# Patient Record
Sex: FEMALE | Race: WHITE | URBAN - METROPOLITAN AREA
[De-identification: names, ages, dates, MRNs, and addresses within clinical notes are randomized per-mention and may not be internally consistent; named-entity substitution may affect disease eponyms.]

---

## 2020-07-10 ENCOUNTER — OFFICE VISIT (OUTPATIENT)
Dept: URBAN - METROPOLITAN AREA CLINIC 54 | Facility: CLINIC | Age: 60
End: 2020-07-10

## 2020-07-20 ENCOUNTER — OFFICE VISIT (OUTPATIENT)
Dept: URBAN - METROPOLITAN AREA CLINIC 54 | Facility: CLINIC | Age: 60
End: 2020-07-20
Payer: COMMERCIAL

## 2020-07-20 ENCOUNTER — DASHBOARD ENCOUNTERS (OUTPATIENT)
Age: 60
End: 2020-07-20

## 2020-07-20 DIAGNOSIS — M54.9 CHRONIC BACK PAIN: ICD-10-CM

## 2020-07-20 DIAGNOSIS — F11.20 NARCOTIC DEPENDENCE: ICD-10-CM

## 2020-07-20 DIAGNOSIS — K21.9 GERD: ICD-10-CM

## 2020-07-20 PROBLEM — 75544000 OPIOID DEPENDENCE: Status: ACTIVE | Noted: 2020-07-20

## 2020-07-20 PROBLEM — 134407002 CHRONIC BACK PAIN: Status: ACTIVE | Noted: 2020-07-20

## 2020-07-20 PROBLEM — 235595009 GASTROESOPHAGEAL REFLUX DISEASE: Status: ACTIVE | Noted: 2020-07-20

## 2020-07-20 PROCEDURE — 99203 OFFICE O/P NEW LOW 30 MIN: CPT | Performed by: INTERNAL MEDICINE

## 2020-07-20 RX ORDER — ZOLPIDEM TARTRATE 10 MG/1
(SCHEDULE IV DRUG) TABLET ORAL
Qty: 30 DELAYED RELEASE TABLET | Status: ACTIVE | COMMUNITY

## 2020-07-20 RX ORDER — ROSUVASTATIN CALCIUM 40 MG/1
TABLET, FILM COATED ORAL
Qty: 90 DELAYED RELEASE TABLET | Status: ACTIVE | COMMUNITY

## 2020-07-20 RX ORDER — MONTELUKAST SODIUM 10 MG/1
1 TABLET TABLET ORAL ONCE A DAY
Qty: 30 | Status: ACTIVE | COMMUNITY
Start: 2020-07-20

## 2020-07-20 RX ORDER — RAMELTEON 8 MG/1
1 TABLET AT BEDTIME AS NEEDED TABLET, FILM COATED ORAL ONCE A DAY
Status: ACTIVE | COMMUNITY
Start: 2020-07-20

## 2020-07-20 RX ORDER — TIZANIDINE 4 MG/1
TABLET ORAL
Qty: 450 DELAYED RELEASE TABLET | Status: ACTIVE | COMMUNITY

## 2020-07-20 RX ORDER — PREGABALIN 75 MG/1
(SCHEDULE V DRUG) CAPSULE ORAL
Qty: 270 APP | Status: ACTIVE | COMMUNITY

## 2020-07-20 RX ORDER — TAPENTADOL HYDROCHLORIDE 100 MG/1
(SCHEDULE II DRUG) TABLET, FILM COATED ORAL
Qty: 120 DELAYED RELEASE TABLET | Status: ACTIVE | COMMUNITY

## 2020-07-20 RX ORDER — ERGOCALCIFEROL 1.25 MG/1
CAPSULE, LIQUID FILLED ORAL
Qty: 13 APP | Status: ACTIVE | COMMUNITY

## 2020-07-20 RX ORDER — AMLODIPINE BESYLATE 5 MG/1
TABLET ORAL
Qty: 30 DELAYED RELEASE TABLET | Status: ACTIVE | COMMUNITY

## 2020-07-20 RX ORDER — FENOFIBRATE 160 MG/1
1 TABLET TABLET ORAL ONCE A DAY
Qty: 30 | Status: ACTIVE | COMMUNITY
Start: 2020-07-20

## 2020-07-20 NOTE — HPI-OTHER HISTORIES
Patient is a 59 yo woman who is sent for an opinion regarding GERD.  Patient carries a diagnosis of GERD and hiatal hernia since age 37. She has daily heartburn with regurgitation. She takes OTC Pepcid AC 2 pills BID. She has tried Zantac for several years previously. Her last EGD was several years ago. She is not very keen on taking   medications. No family history of upper GI malignancy. She denies fever, chills, weight loss, melena, dysphagia or early satiety. Her last colonoscopy was at age 52 and normal per patient. She suffers from chronic back pain on narcotics and NSAID's.

## 2020-08-04 ENCOUNTER — CLAIMS CREATED FROM THE CLAIM WINDOW (OUTPATIENT)
Dept: URBAN - METROPOLITAN AREA CLINIC 4 | Facility: CLINIC | Age: 60
End: 2020-08-04
Payer: COMMERCIAL

## 2020-08-04 ENCOUNTER — OFFICE VISIT (OUTPATIENT)
Dept: URBAN - METROPOLITAN AREA SURGERY CENTER 14 | Facility: SURGERY CENTER | Age: 60
End: 2020-08-04
Payer: COMMERCIAL

## 2020-08-04 DIAGNOSIS — K29.60 OTHER GASTRITIS WITHOUT BLEEDING: ICD-10-CM

## 2020-08-04 DIAGNOSIS — R12 BURNING REFLUX: ICD-10-CM

## 2020-08-04 DIAGNOSIS — K31.9 DISEASE OF STOMACH AND DUODENUM, UNSPECIFIED: ICD-10-CM

## 2020-08-04 DIAGNOSIS — K21.0 GASTRO-ESOPHAGEAL REFLUX DISEASE WITH ESOPHAGITIS: ICD-10-CM

## 2020-08-04 DIAGNOSIS — K31.89 ACQUIRED DEFORMITY OF PYLORUS: ICD-10-CM

## 2020-08-04 PROCEDURE — 43239 EGD BIOPSY SINGLE/MULTIPLE: CPT | Performed by: INTERNAL MEDICINE

## 2020-08-04 PROCEDURE — 88312 SPECIAL STAINS GROUP 1: CPT | Performed by: PATHOLOGY

## 2020-08-04 PROCEDURE — G8907 PT DOC NO EVENTS ON DISCHARG: HCPCS | Performed by: INTERNAL MEDICINE

## 2020-08-04 PROCEDURE — 88305 TISSUE EXAM BY PATHOLOGIST: CPT | Performed by: PATHOLOGY

## 2020-08-04 RX ORDER — ROSUVASTATIN CALCIUM 40 MG/1
TABLET, FILM COATED ORAL
Qty: 90 DELAYED RELEASE TABLET | Status: ACTIVE | COMMUNITY

## 2020-08-04 RX ORDER — ERGOCALCIFEROL 1.25 MG/1
CAPSULE, LIQUID FILLED ORAL
Qty: 13 APP | Status: ACTIVE | COMMUNITY

## 2020-08-04 RX ORDER — TIZANIDINE 4 MG/1
TABLET ORAL
Qty: 450 DELAYED RELEASE TABLET | Status: ACTIVE | COMMUNITY

## 2020-08-04 RX ORDER — MONTELUKAST SODIUM 10 MG/1
1 TABLET TABLET ORAL ONCE A DAY
Qty: 30 | Status: ACTIVE | COMMUNITY
Start: 2020-07-20

## 2020-08-04 RX ORDER — TAPENTADOL HYDROCHLORIDE 100 MG/1
(SCHEDULE II DRUG) TABLET, FILM COATED ORAL
Qty: 120 DELAYED RELEASE TABLET | Status: ACTIVE | COMMUNITY

## 2020-08-04 RX ORDER — ZOLPIDEM TARTRATE 10 MG/1
(SCHEDULE IV DRUG) TABLET ORAL
Qty: 30 DELAYED RELEASE TABLET | Status: ACTIVE | COMMUNITY

## 2020-08-04 RX ORDER — AMLODIPINE BESYLATE 5 MG/1
TABLET ORAL
Qty: 30 DELAYED RELEASE TABLET | Status: ACTIVE | COMMUNITY

## 2020-08-04 RX ORDER — RAMELTEON 8 MG/1
1 TABLET AT BEDTIME AS NEEDED TABLET, FILM COATED ORAL ONCE A DAY
Status: ACTIVE | COMMUNITY
Start: 2020-07-20

## 2020-08-04 RX ORDER — PREGABALIN 75 MG/1
(SCHEDULE V DRUG) CAPSULE ORAL
Qty: 270 APP | Status: ACTIVE | COMMUNITY

## 2020-08-04 RX ORDER — FENOFIBRATE 160 MG/1
1 TABLET TABLET ORAL ONCE A DAY
Qty: 30 | Status: ACTIVE | COMMUNITY
Start: 2020-07-20